# Patient Record
Sex: MALE | Race: WHITE | NOT HISPANIC OR LATINO | URBAN - METROPOLITAN AREA
[De-identification: names, ages, dates, MRNs, and addresses within clinical notes are randomized per-mention and may not be internally consistent; named-entity substitution may affect disease eponyms.]

---

## 2017-05-12 ENCOUNTER — IMPORTED ENCOUNTER (OUTPATIENT)
Dept: URBAN - METROPOLITAN AREA CLINIC 50 | Facility: CLINIC | Age: 82
End: 2017-05-12

## 2017-05-23 ENCOUNTER — IMPORTED ENCOUNTER (OUTPATIENT)
Dept: URBAN - METROPOLITAN AREA CLINIC 50 | Facility: CLINIC | Age: 82
End: 2017-05-23

## 2017-05-31 ENCOUNTER — IMPORTED ENCOUNTER (OUTPATIENT)
Dept: URBAN - METROPOLITAN AREA CLINIC 50 | Facility: CLINIC | Age: 82
End: 2017-05-31

## 2017-06-01 ENCOUNTER — IMPORTED ENCOUNTER (OUTPATIENT)
Dept: URBAN - METROPOLITAN AREA CLINIC 50 | Facility: CLINIC | Age: 82
End: 2017-06-01

## 2017-06-15 ENCOUNTER — IMPORTED ENCOUNTER (OUTPATIENT)
Dept: URBAN - METROPOLITAN AREA CLINIC 50 | Facility: CLINIC | Age: 82
End: 2017-06-15

## 2017-06-20 ENCOUNTER — IMPORTED ENCOUNTER (OUTPATIENT)
Dept: URBAN - METROPOLITAN AREA CLINIC 50 | Facility: CLINIC | Age: 82
End: 2017-06-20

## 2017-06-28 PROBLEM — Z96.1: Noted: 2017-06-28

## 2017-06-28 PROBLEM — Z96.1: Noted: 2017-06-14

## 2017-06-28 PROBLEM — Z98.890: Noted: 2019-05-14

## 2017-06-29 ENCOUNTER — IMPORTED ENCOUNTER (OUTPATIENT)
Dept: URBAN - METROPOLITAN AREA CLINIC 50 | Facility: CLINIC | Age: 82
End: 2017-06-29

## 2017-07-06 ENCOUNTER — IMPORTED ENCOUNTER (OUTPATIENT)
Dept: URBAN - METROPOLITAN AREA CLINIC 50 | Facility: CLINIC | Age: 82
End: 2017-07-06

## 2017-07-18 ENCOUNTER — IMPORTED ENCOUNTER (OUTPATIENT)
Dept: URBAN - METROPOLITAN AREA CLINIC 50 | Facility: CLINIC | Age: 82
End: 2017-07-18

## 2017-11-21 ENCOUNTER — IMPORTED ENCOUNTER (OUTPATIENT)
Dept: URBAN - METROPOLITAN AREA CLINIC 50 | Facility: CLINIC | Age: 82
End: 2017-11-21

## 2019-05-14 ENCOUNTER — IMPORTED ENCOUNTER (OUTPATIENT)
Dept: URBAN - METROPOLITAN AREA CLINIC 50 | Facility: CLINIC | Age: 84
End: 2019-05-14

## 2019-06-25 ENCOUNTER — IMPORTED ENCOUNTER (OUTPATIENT)
Dept: URBAN - METROPOLITAN AREA CLINIC 50 | Facility: CLINIC | Age: 84
End: 2019-06-25

## 2020-12-29 ENCOUNTER — IMPORTED ENCOUNTER (OUTPATIENT)
Dept: URBAN - METROPOLITAN AREA CLINIC 50 | Facility: CLINIC | Age: 85
End: 2020-12-29

## 2020-12-29 NOTE — PATIENT DISCUSSION
"""Informed patient that their capsular opacification is not visually significant or does not meet the "" Quality 111:Pneumonia Vaccination Status For Older Adults: Pneumococcal Vaccination Previously Received

## 2021-01-21 ENCOUNTER — IMPORTED ENCOUNTER (OUTPATIENT)
Dept: URBAN - METROPOLITAN AREA CLINIC 50 | Facility: CLINIC | Age: 86
End: 2021-01-21

## 2021-06-02 ENCOUNTER — PREPPED CHART (OUTPATIENT)
Dept: URBAN - METROPOLITAN AREA CLINIC 50 | Facility: CLINIC | Age: 86
End: 2021-06-02

## 2021-06-03 ENCOUNTER — ROUTINE EXAM (OUTPATIENT)
Dept: URBAN - METROPOLITAN AREA CLINIC 52 | Facility: CLINIC | Age: 86
End: 2021-06-03

## 2021-06-03 DIAGNOSIS — H26.492: ICD-10-CM

## 2021-06-03 DIAGNOSIS — E11.9: ICD-10-CM

## 2021-06-03 DIAGNOSIS — Z01.01: ICD-10-CM

## 2021-06-03 DIAGNOSIS — H43.813: ICD-10-CM

## 2021-06-03 PROCEDURE — 92014 COMPRE OPH EXAM EST PT 1/>: CPT

## 2021-06-03 PROCEDURE — 92015 DETERMINE REFRACTIVE STATE: CPT

## 2021-06-03 ASSESSMENT — VISUAL ACUITY
OS_GLARE: 20/30
OD_CC: 20/20-2
OS_GLARE: 20/30
OS_CC: 20/20-2
OU_CC: J1+

## 2021-06-03 ASSESSMENT — TONOMETRY
OS_IOP_MMHG: 15
OD_IOP_MMHG: 15

## 2021-06-14 ASSESSMENT — VISUAL ACUITY
OD_OTHER: 20/40. 20/40-.
OD_CC: 20/40+2
OD_BAT: 20/80
OD_BAT: 20/80
OD_SC: 20/30-2
OD_CC: 20/20
OD_CC: J1+
OS_OTHER: 20/70. 20/200.
OD_PH: 20/30-
OS_OTHER: 20/25. 20/25.
OS_BAT: 20/40
OD_SC: 20/70
OD_OTHER: 20/80. >20/400.
OS_BAT: 20/25
OD_BAT: 20/80
OS_SC: 20/30+2
OS_OTHER: 20/60-. 20/70.
OD_OTHER: 20/60-. 20/70.
OD_CC: J1+
OD_CC: J1+
OS_OTHER: 20/25. 20/30.
OS_CC: J1+
OS_CC: 20/30+
OD_CC: J1+
OD_SC: 20/30-1
OD_BAT: 20/40
OS_CC: J1+
OS_BAT: 20/70
OS_SC: 20/30-1
OS_CC: 20/20
OD_OTHER: 20/80. >20/400.
OD_SC: 20/25
OD_OTHER: 20/80. >20/400.
OD_OTHER: 20/40. 20/40.
OD_SC: 20/40
OS_BAT: 20/25
OS_SC: 20/20-
OS_CC: 20/20
OS_OTHER: 20/70. 20/70.
OD_CC: 20/40+
OS_CC: J1+
OD_BAT: 20/40
OS_OTHER: 20/40. 20/40-.
OS_CC: 20/20
OD_BAT: 20/60-
OD_SC: 20/25=
OD_PH: 20/20
OS_SC: 20/40
OD_CC: 20/20-
OS_BAT: 20/70
OS_SC: 20/40
OS_CC: J1+
OS_CC: 20/20-1
OS_OTHER: 20/70. 20/200.
OS_BAT: 20/70
OS_BAT: 20/60-

## 2021-06-14 ASSESSMENT — TONOMETRY
OD_IOP_MMHG: 16
OD_IOP_MMHG: 26
OS_IOP_MMHG: 13
OD_IOP_MMHG: 14
OS_IOP_MMHG: 14
OS_IOP_MMHG: 18
OS_IOP_MMHG: 14
OD_IOP_MMHG: 16
OS_IOP_MMHG: 13
OS_IOP_MMHG: 22
OS_IOP_MMHG: 13
OD_IOP_MMHG: 14
OD_IOP_MMHG: 18
OS_IOP_MMHG: 14
OD_IOP_MMHG: 14
OD_IOP_MMHG: 14
OS_IOP_MMHG: 16
OS_IOP_MMHG: 16
OD_IOP_MMHG: 13
OD_IOP_MMHG: 14

## 2022-04-27 ENCOUNTER — PREPPED CHART (OUTPATIENT)
Dept: URBAN - METROPOLITAN AREA CLINIC 50 | Facility: CLINIC | Age: 87
End: 2022-04-27

## 2022-04-27 NOTE — PATIENT DISCUSSION
No signs of diabetic retinopathy found. Recommend annual dilated examinations. Patient instructed to call office immediately if sudden changes in vision occur. Emphasized importance of good blood glucose control. Summary of care provided to the physician managing the ongoing diabetes care.

## 2022-06-07 ENCOUNTER — ESTABLISHED PATIENT (OUTPATIENT)
Dept: URBAN - METROPOLITAN AREA CLINIC 50 | Facility: CLINIC | Age: 87
End: 2022-06-07

## 2022-06-07 DIAGNOSIS — H26.492: ICD-10-CM

## 2022-06-07 DIAGNOSIS — H43.813: ICD-10-CM

## 2022-06-07 DIAGNOSIS — E11.9: ICD-10-CM

## 2022-06-07 DIAGNOSIS — Z01.01: ICD-10-CM

## 2022-06-07 PROCEDURE — 92015 DETERMINE REFRACTIVE STATE: CPT

## 2022-06-07 PROCEDURE — 92014 COMPRE OPH EXAM EST PT 1/>: CPT

## 2022-06-07 ASSESSMENT — VISUAL ACUITY
OU_CC: J1+@14"
OU_CC: 20/20
OS_CC: 20/25
OD_CC: 20/25
OS_GLARE: 20/30

## 2022-06-07 ASSESSMENT — TONOMETRY
OS_IOP_MMHG: 15
OD_IOP_MMHG: 15

## 2023-04-11 ENCOUNTER — COMPREHENSIVE EXAM (OUTPATIENT)
Dept: URBAN - METROPOLITAN AREA CLINIC 50 | Facility: CLINIC | Age: 88
End: 2023-04-11

## 2023-04-11 DIAGNOSIS — Z01.01: ICD-10-CM

## 2023-04-11 DIAGNOSIS — H26.492: ICD-10-CM

## 2023-04-11 DIAGNOSIS — E11.9: ICD-10-CM

## 2023-04-11 DIAGNOSIS — H43.813: ICD-10-CM

## 2023-04-11 PROCEDURE — 92015 DETERMINE REFRACTIVE STATE: CPT

## 2023-04-11 PROCEDURE — 92014 COMPRE OPH EXAM EST PT 1/>: CPT

## 2023-04-11 ASSESSMENT — VISUAL ACUITY
OS_GLARE: 20/30
OU_CC: J1+@16"
OS_GLARE: 20/50
OD_CC: 20/25-3
OS_CC: 20/20-1
OU_CC: 20/20-1

## 2023-04-11 ASSESSMENT — TONOMETRY
OD_IOP_MMHG: 12
OS_IOP_MMHG: 14

## 2023-12-26 ENCOUNTER — PREPPED CHART (OUTPATIENT)
Dept: URBAN - METROPOLITAN AREA CLINIC 50 | Facility: LOCATION | Age: 88
End: 2023-12-26

## 2024-01-31 NOTE — PATIENT DISCUSSION
"""Annual Type 2 Diabetic eye exam with dilation. No diabetic retinopathy found. Recommend annual dilated examinations. Patient instructed to call office immediately if sudden changes in vision occur. Emphasized importance of good blood glucose control. Summary of care provided to the physician managing the ongoing diabetes care. """ How Severe Is Your Skin Lesion?: mild Have Your Skin Lesions Been Treated?: not been treated Is This A New Presentation, Or A Follow-Up?: Skin Lesions